# Patient Record
Sex: MALE | Race: WHITE | NOT HISPANIC OR LATINO | Employment: UNEMPLOYED | URBAN - METROPOLITAN AREA
[De-identification: names, ages, dates, MRNs, and addresses within clinical notes are randomized per-mention and may not be internally consistent; named-entity substitution may affect disease eponyms.]

---

## 2024-07-02 ENCOUNTER — OFFICE VISIT (OUTPATIENT)
Age: 19
End: 2024-07-02
Payer: COMMERCIAL

## 2024-07-02 VITALS
WEIGHT: 135 LBS | OXYGEN SATURATION: 98 % | SYSTOLIC BLOOD PRESSURE: 118 MMHG | DIASTOLIC BLOOD PRESSURE: 40 MMHG | RESPIRATION RATE: 18 BRPM | HEART RATE: 80 BPM | TEMPERATURE: 97 F

## 2024-07-02 DIAGNOSIS — S68.119A FINGER AMPUTATION, TRAUMATIC, INITIAL ENCOUNTER: Primary | ICD-10-CM

## 2024-07-02 PROCEDURE — G0382 LEV 3 HOSP TYPE B ED VISIT: HCPCS

## 2024-07-02 PROCEDURE — S9083 URGENT CARE CENTER GLOBAL: HCPCS

## 2024-07-02 RX ORDER — CETIRIZINE HYDROCHLORIDE 10 MG/1
TABLET ORAL
COMMUNITY

## 2024-07-02 NOTE — PROGRESS NOTES
[facility]        NAME: Spike Arizmendi is a 18 y.o. male  : 2005    MRN: [id number]  DATE: 2024  TIME: 5:40 PM    Assessment and Plan   Finger amputation, traumatic, initial encounter [S68.119A]  1. Finger amputation, traumatic, initial encounter  Transfer to other facility        Report given to [facility] crew.    Patient Instructions   Ambulance was called to take you to the hospital for higher level of care.    Chief Complaint     Chief Complaint   Patient presents with   • Hand Injury     Right hand 2nd, 3rd, and 4th digits amputated from  accident about 30 minutes ago. Vital signs at 1725 are Temp 97.0F, 118/40 BP, 98% O2, 80bpm pulse, 18RR. Pain 7/10. Diaphoretic, pale, alert and oriented. Grandfather at side. EMS called. Wet dressing and pressure applied to right hand fingers, elevated above heart.         History of Present Illness       Amputated parts of his fingers from a lawnmower.        Review of Systems   Review of Systems   Respiratory:  Negative for shortness of breath.    Cardiovascular:  Negative for chest pain.   Skin:  Positive for color change and wound.   Neurological:  Negative for dizziness and headaches.         Current Medications       Current Outpatient Medications:   •  cetirizine (ZyrTEC) 10 mg tablet, Take by mouth, Disp: , Rfl:     Current Allergies     Allergies as of 2024   • (No Known Allergies)            The following portions of the patient's history were reviewed and updated as appropriate: allergies, current medications, past family history, past medical history, past social history, past surgical history and problem list.     History reviewed. No pertinent past medical history.    History reviewed. No pertinent surgical history.    History reviewed. No pertinent family history.      Medications have been verified.        Objective   BP (!) 118/40   Pulse 80   Temp (!) 97 °F (36.1 °C)   Resp 18   Wt 61.2 kg (135 lb) Comment:  stated  SpO2 98%   No LMP for male patient.       Physical Exam     Physical Exam  Vitals and nursing note reviewed.   Constitutional:       General: He is in acute distress (Trauma).   Cardiovascular:      Pulses: Normal pulses.   Pulmonary:      Effort: Pulmonary effort is normal.      Breath sounds: Normal breath sounds.   Musculoskeletal:        Hands:    Skin:     General: Skin is cool and moist.      Comments: He was diaphoretic and clammy. Was able to answer all questions fully, no LOC.    Neurological:      Mental Status: He is alert.